# Patient Record
Sex: MALE | Race: WHITE | NOT HISPANIC OR LATINO
[De-identification: names, ages, dates, MRNs, and addresses within clinical notes are randomized per-mention and may not be internally consistent; named-entity substitution may affect disease eponyms.]

---

## 2017-01-09 ENCOUNTER — APPOINTMENT (OUTPATIENT)
Dept: HEART AND VASCULAR | Facility: CLINIC | Age: 37
End: 2017-01-09

## 2017-01-09 ENCOUNTER — NON-APPOINTMENT (OUTPATIENT)
Age: 37
End: 2017-01-09

## 2017-01-09 VITALS
DIASTOLIC BLOOD PRESSURE: 92 MMHG | HEART RATE: 107 BPM | WEIGHT: 297 LBS | BODY MASS INDEX: 40.23 KG/M2 | HEIGHT: 72 IN | OXYGEN SATURATION: 98 % | SYSTOLIC BLOOD PRESSURE: 138 MMHG

## 2017-01-09 DIAGNOSIS — E66.9 OBESITY, UNSPECIFIED: ICD-10-CM

## 2017-01-09 DIAGNOSIS — Z78.9 OTHER SPECIFIED HEALTH STATUS: ICD-10-CM

## 2017-01-09 DIAGNOSIS — Z87.891 PERSONAL HISTORY OF NICOTINE DEPENDENCE: ICD-10-CM

## 2017-01-09 DIAGNOSIS — Z83.3 FAMILY HISTORY OF DIABETES MELLITUS: ICD-10-CM

## 2017-01-09 RX ORDER — AMLODIPINE BESYLATE 5 MG/1
5 TABLET ORAL
Refills: 0 | Status: ACTIVE | COMMUNITY

## 2017-01-17 ENCOUNTER — APPOINTMENT (OUTPATIENT)
Dept: HEART AND VASCULAR | Facility: CLINIC | Age: 37
End: 2017-01-17

## 2017-01-17 VITALS — SYSTOLIC BLOOD PRESSURE: 142 MMHG | DIASTOLIC BLOOD PRESSURE: 70 MMHG

## 2017-01-17 DIAGNOSIS — I10 ESSENTIAL (PRIMARY) HYPERTENSION: ICD-10-CM

## 2017-01-17 DIAGNOSIS — R07.89 OTHER CHEST PAIN: ICD-10-CM

## 2017-01-17 DIAGNOSIS — R94.31 ABNORMAL ELECTROCARDIOGRAM [ECG] [EKG]: ICD-10-CM

## 2017-01-17 DIAGNOSIS — E78.5 HYPERLIPIDEMIA, UNSPECIFIED: ICD-10-CM

## 2017-01-17 RX ORDER — ROSUVASTATIN CALCIUM 10 MG/1
10 TABLET, FILM COATED ORAL DAILY
Qty: 30 | Refills: 6 | Status: ACTIVE | COMMUNITY
Start: 2017-01-17

## 2017-05-01 ENCOUNTER — APPOINTMENT (OUTPATIENT)
Dept: HEART AND VASCULAR | Facility: CLINIC | Age: 37
End: 2017-05-01

## 2019-10-08 ENCOUNTER — INPATIENT (INPATIENT)
Facility: HOSPITAL | Age: 39
LOS: 0 days | Discharge: HOME | DRG: 299 | End: 2019-10-09
Attending: INTERNAL MEDICINE | Admitting: INTERNAL MEDICINE
Payer: COMMERCIAL

## 2019-10-08 VITALS
RESPIRATION RATE: 20 BRPM | SYSTOLIC BLOOD PRESSURE: 116 MMHG | WEIGHT: 300.05 LBS | OXYGEN SATURATION: 91 % | HEART RATE: 125 BPM | TEMPERATURE: 98 F | DIASTOLIC BLOOD PRESSURE: 74 MMHG

## 2019-10-08 DIAGNOSIS — Z91.89 OTHER SPECIFIED PERSONAL RISK FACTORS, NOT ELSEWHERE CLASSIFIED: ICD-10-CM

## 2019-10-08 DIAGNOSIS — M10.9 GOUT, UNSPECIFIED: ICD-10-CM

## 2019-10-08 DIAGNOSIS — I26.99 OTHER PULMONARY EMBOLISM WITHOUT ACUTE COR PULMONALE: ICD-10-CM

## 2019-10-08 DIAGNOSIS — E04.1 NONTOXIC SINGLE THYROID NODULE: ICD-10-CM

## 2019-10-08 DIAGNOSIS — I80.10 PHLEBITIS AND THROMBOPHLEBITIS OF UNSPECIFIED FEMORAL VEIN: ICD-10-CM

## 2019-10-08 DIAGNOSIS — I82.491 ACUTE EMBOLISM AND THROMBOSIS OF OTHER SPECIFIED DEEP VEIN OF RIGHT LOWER EXTREMITY: ICD-10-CM

## 2019-10-08 DIAGNOSIS — I10 ESSENTIAL (PRIMARY) HYPERTENSION: ICD-10-CM

## 2019-10-08 DIAGNOSIS — F41.9 ANXIETY DISORDER, UNSPECIFIED: ICD-10-CM

## 2019-10-08 DIAGNOSIS — G47.33 OBSTRUCTIVE SLEEP APNEA (ADULT) (PEDIATRIC): ICD-10-CM

## 2019-10-08 DIAGNOSIS — J96.01 ACUTE RESPIRATORY FAILURE WITH HYPOXIA: ICD-10-CM

## 2019-10-08 DIAGNOSIS — Z29.9 ENCOUNTER FOR PROPHYLACTIC MEASURES, UNSPECIFIED: ICD-10-CM

## 2019-10-08 LAB
ALBUMIN SERPL ELPH-MCNC: 4.1 G/DL — SIGNIFICANT CHANGE UP (ref 3.4–5)
ALP SERPL-CCNC: 112 U/L — SIGNIFICANT CHANGE UP (ref 40–120)
ALT FLD-CCNC: 45 U/L — HIGH (ref 12–42)
ANION GAP SERPL CALC-SCNC: 11 MMOL/L — SIGNIFICANT CHANGE UP (ref 9–16)
APTT BLD: 120.6 SEC — CRITICAL HIGH (ref 27.5–36.3)
APTT BLD: 31.5 SEC — SIGNIFICANT CHANGE UP (ref 27.5–36.3)
AST SERPL-CCNC: 28 U/L — SIGNIFICANT CHANGE UP (ref 15–37)
BASOPHILS NFR BLD AUTO: 0.3 % — SIGNIFICANT CHANGE UP (ref 0–2)
BILIRUB SERPL-MCNC: 1.2 MG/DL — SIGNIFICANT CHANGE UP (ref 0.2–1.2)
BUN SERPL-MCNC: 10 MG/DL — SIGNIFICANT CHANGE UP (ref 7–23)
CALCIUM SERPL-MCNC: 9.1 MG/DL — SIGNIFICANT CHANGE UP (ref 8.5–10.5)
CHLORIDE SERPL-SCNC: 102 MMOL/L — SIGNIFICANT CHANGE UP (ref 96–108)
CO2 SERPL-SCNC: 27 MMOL/L — SIGNIFICANT CHANGE UP (ref 22–31)
CREAT SERPL-MCNC: 1.19 MG/DL — SIGNIFICANT CHANGE UP (ref 0.5–1.3)
D DIMER BLD IA.RAPID-MCNC: 879 NG/ML DDU — HIGH
EOSINOPHIL NFR BLD AUTO: 1.9 % — SIGNIFICANT CHANGE UP (ref 0–6)
GLUCOSE SERPL-MCNC: 167 MG/DL — HIGH (ref 70–99)
HCT VFR BLD CALC: 51.8 % — HIGH (ref 39–50)
HGB BLD-MCNC: 17.7 G/DL — HIGH (ref 13–17)
IMM GRANULOCYTES NFR BLD AUTO: 0.5 % — SIGNIFICANT CHANGE UP (ref 0–1.5)
INR BLD: 1.09 — SIGNIFICANT CHANGE UP (ref 0.88–1.16)
INR BLD: 1.09 — SIGNIFICANT CHANGE UP (ref 0.88–1.16)
LYMPHOCYTES # BLD AUTO: 22.4 % — SIGNIFICANT CHANGE UP (ref 13–44)
MCHC RBC-ENTMCNC: 29.6 PG — SIGNIFICANT CHANGE UP (ref 27–34)
MCHC RBC-ENTMCNC: 34.2 G/DL — SIGNIFICANT CHANGE UP (ref 32–36)
MCV RBC AUTO: 86.6 FL — SIGNIFICANT CHANGE UP (ref 80–100)
MONOCYTES NFR BLD AUTO: 8.3 % — SIGNIFICANT CHANGE UP (ref 2–14)
NEUTROPHILS NFR BLD AUTO: 66.6 % — SIGNIFICANT CHANGE UP (ref 43–77)
NT-PROBNP SERPL-SCNC: 27 PG/ML — SIGNIFICANT CHANGE UP
PLATELET # BLD AUTO: 288 K/UL — SIGNIFICANT CHANGE UP (ref 150–400)
POTASSIUM SERPL-MCNC: 3.9 MMOL/L — SIGNIFICANT CHANGE UP (ref 3.5–5.3)
POTASSIUM SERPL-SCNC: 3.9 MMOL/L — SIGNIFICANT CHANGE UP (ref 3.5–5.3)
PROT SERPL-MCNC: 8 G/DL — SIGNIFICANT CHANGE UP (ref 6.4–8.2)
PROTHROM AB SERPL-ACNC: 12.1 SEC — SIGNIFICANT CHANGE UP (ref 10–12.9)
PROTHROM AB SERPL-ACNC: 12.4 SEC — SIGNIFICANT CHANGE UP (ref 10–12.9)
RBC # BLD: 5.98 M/UL — HIGH (ref 4.2–5.8)
RBC # FLD: 12 % — SIGNIFICANT CHANGE UP (ref 10.3–14.5)
SODIUM SERPL-SCNC: 140 MMOL/L — SIGNIFICANT CHANGE UP (ref 132–145)
TROPONIN I SERPL-MCNC: <0.017 NG/ML — LOW (ref 0.02–0.06)
WBC # BLD: 12.1 K/UL — HIGH (ref 3.8–10.5)
WBC # FLD AUTO: 12.1 K/UL — HIGH (ref 3.8–10.5)

## 2019-10-08 PROCEDURE — 71275 CT ANGIOGRAPHY CHEST: CPT | Mod: 26

## 2019-10-08 PROCEDURE — 99285 EMERGENCY DEPT VISIT HI MDM: CPT

## 2019-10-08 PROCEDURE — 93971 EXTREMITY STUDY: CPT | Mod: 26,RT

## 2019-10-08 PROCEDURE — 93010 ELECTROCARDIOGRAM REPORT: CPT | Mod: NC

## 2019-10-08 PROCEDURE — 93010 ELECTROCARDIOGRAM REPORT: CPT

## 2019-10-08 PROCEDURE — 99223 1ST HOSP IP/OBS HIGH 75: CPT | Mod: GC

## 2019-10-08 RX ORDER — HEPARIN SODIUM 5000 [USP'U]/ML
INJECTION INTRAVENOUS; SUBCUTANEOUS
Qty: 25000 | Refills: 0 | Status: DISCONTINUED | OUTPATIENT
Start: 2019-10-08 | End: 2019-10-08

## 2019-10-08 RX ORDER — HEPARIN SODIUM 5000 [USP'U]/ML
10000 INJECTION INTRAVENOUS; SUBCUTANEOUS ONCE
Refills: 0 | Status: COMPLETED | OUTPATIENT
Start: 2019-10-08 | End: 2019-10-08

## 2019-10-08 RX ORDER — HEPARIN SODIUM 5000 [USP'U]/ML
2100 INJECTION INTRAVENOUS; SUBCUTANEOUS
Qty: 25000 | Refills: 0 | Status: DISCONTINUED | OUTPATIENT
Start: 2019-10-08 | End: 2019-10-09

## 2019-10-08 RX ORDER — ALLOPURINOL 300 MG
100 TABLET ORAL DAILY
Refills: 0 | Status: DISCONTINUED | OUTPATIENT
Start: 2019-10-08 | End: 2019-10-09

## 2019-10-08 RX ORDER — HEPARIN SODIUM 5000 [USP'U]/ML
5000 INJECTION INTRAVENOUS; SUBCUTANEOUS EVERY 6 HOURS
Refills: 0 | Status: DISCONTINUED | OUTPATIENT
Start: 2019-10-08 | End: 2019-10-08

## 2019-10-08 RX ORDER — SODIUM CHLORIDE 9 MG/ML
3 INJECTION INTRAMUSCULAR; INTRAVENOUS; SUBCUTANEOUS ONCE
Refills: 0 | Status: COMPLETED | OUTPATIENT
Start: 2019-10-08 | End: 2019-10-08

## 2019-10-08 RX ORDER — IPRATROPIUM/ALBUTEROL SULFATE 18-103MCG
3 AEROSOL WITH ADAPTER (GRAM) INHALATION EVERY 6 HOURS
Refills: 0 | Status: DISCONTINUED | OUTPATIENT
Start: 2019-10-08 | End: 2019-10-09

## 2019-10-08 RX ORDER — HEPARIN SODIUM 5000 [USP'U]/ML
10000 INJECTION INTRAVENOUS; SUBCUTANEOUS EVERY 6 HOURS
Refills: 0 | Status: DISCONTINUED | OUTPATIENT
Start: 2019-10-08 | End: 2019-10-08

## 2019-10-08 RX ORDER — SODIUM CHLORIDE 9 MG/ML
1000 INJECTION INTRAMUSCULAR; INTRAVENOUS; SUBCUTANEOUS ONCE
Refills: 0 | Status: COMPLETED | OUTPATIENT
Start: 2019-10-08 | End: 2019-10-08

## 2019-10-08 RX ADMIN — HEPARIN SODIUM 21 UNIT(S)/HR: 5000 INJECTION INTRAVENOUS; SUBCUTANEOUS at 21:19

## 2019-10-08 RX ADMIN — HEPARIN SODIUM 10000 UNIT(S): 5000 INJECTION INTRAVENOUS; SUBCUTANEOUS at 16:16

## 2019-10-08 RX ADMIN — HEPARIN SODIUM 2400 UNIT(S)/HR: 5000 INJECTION INTRAVENOUS; SUBCUTANEOUS at 16:16

## 2019-10-08 RX ADMIN — SODIUM CHLORIDE 3 MILLILITER(S): 9 INJECTION INTRAMUSCULAR; INTRAVENOUS; SUBCUTANEOUS at 15:04

## 2019-10-08 RX ADMIN — SODIUM CHLORIDE 1000 MILLILITER(S): 9 INJECTION INTRAMUSCULAR; INTRAVENOUS; SUBCUTANEOUS at 22:01

## 2019-10-08 NOTE — ED PROVIDER NOTE - OBJECTIVE STATEMENT
39 y o male with PMHX of HTN (Losartan 50mg, Nifedipine 60mg), HLD, and gout (Allopurinol) presents to ED to r/o DVT/PE. Complaining of LE pain and SOB. States that he was driving and noted pain in his right upper leg. Pt went to his doctor yesterday for evaluation of sx and resulted with an elevated D-dimer. States that he noticed SOB about 2-3 weeks ago when walking on the street and that sx escalated over the weekend. Denies extended travel. Denies smoking. No chest pain, cough, wheezing, calf swelling, N/V, or other sx.

## 2019-10-08 NOTE — H&P ADULT - PROBLEM SELECTOR PLAN 1
Progressive Sim, Ddimer >800 with acute lobar, segmental pulm artery emboli no PA dilation or RHS. Pt is tachy 100-110s. Has risk factors (DEANNE, obesity) without clear provoking triggers (sedentary lifestyle) no fam hx of clotting. Trops and BNP negative. EKG no ischemic changes. Arrived on heparin gtt. EKG No HD instabilty.   -Heparin o/n, plan to transition to oral AC in AM   -Echo   -Introduction to Heme-Onc for duration of treatment and possible hypercoag w/u   -AM ekg  -Possible Pulm consult for management of multiple Pulm co-morbidities

## 2019-10-08 NOTE — ED ADULT TRIAGE NOTE - CHIEF COMPLAINT QUOTE
Sent by [facility] for further evaluation of elevated D-Dimer, worsening SOB, and LLE pain/redness from thigh to calf. Denies CP.

## 2019-10-08 NOTE — ED PROVIDER NOTE - PROGRESS NOTE DETAILS
Results discussed with pt.  Heparin gtt initiated.  Pt accepted to [address] via [facility] transfer center.  Pt is amenable to admission.

## 2019-10-08 NOTE — H&P ADULT - PROBLEM SELECTOR PLAN 6
Risk factors are obesity and sedentary lifestyle. Extensive clot burden in RLE  Plan as above for PE.

## 2019-10-08 NOTE — H&P ADULT - PROBLEM SELECTOR PLAN 4
Incidental heterogenous enlarged thyroid gland with 1.7cm left lower pole nodule. No known thyroid disease in self or family. No overt hypo/hyperthyroid complaints.   -AM TSH  -Thyroid u/s  -Poss endo consult for outpt f/u

## 2019-10-08 NOTE — H&P ADULT - HISTORY OF PRESENT ILLNESS
39M PMH obesity, severe DEANNE presents to [facility] for 2-3 weeks of Sim doing routine activities and 2-3 days of RLE swelling and pain. Pt states at baseline has Sim with strenuous activity (2/2 weight_ now progressive becoming SoB walking to work and light-headed standing up, now a/w dull, cramp-like calf pain. Not associated with sternal chest pain/pressure, diaphoresis, HA, syncopal episodes/falls, 39M PMH obesity, severe DEANNE presents to [facility] for 2-3 weeks of Sim doing routine activities and 2-3 days of RLE swelling and pain. Pt states at baseline has Sim with strenuous activity (2/2 weight) but now progressive becoming SoB walking to work and light-headed standing up, now a/w dull, cramp-like calf pain. Not associated with sternal chest pain/pressure, diaphoresis, HA, syncopal episodes/falls. Sent to [facility] after elevated d-dimer at PCP's office.     [facility] ED Course  Vitals: , /88,  SpO2 91% on RA requiring 2L NC  EKG with sinus tachycardia, no st/t wave change  Pertinent labs: D-dimer 879, trop I<.017, pro-BNP 27; WBC 12.1, Hb 17.7, Cre 1.19  CT-PE: acute lobar, segmental pulm artery emboli no PA dilation or RHS, also heterogenous enlarged thyroid gland with 1.7cm left lower pole nodule. LE dopplers: extensive right-sided DVT extending from the superficial femoral femoral vein to calf veins.  ED interventions: heparin 10,000U IVP then started heparin gtt at 24ml/hr    On arrival on 7lach: -100, /91, RR 16  EKG: sinus tachy 102, left axis deviation, deep S1, Q in V3 and T wave flattening in III; poor R wave progression; no ST/T wave changes   Pt slight tachypneic, no acute distress, gives history above. 39M PMH HTN, gout, obesity, severe DEANNE noncompliant with CPAP presents to [facility] for 2-3 weeks of Sim doing routine activities and 2-3 days of RLE swelling and pain. Pt states at baseline has Sim with strenuous activity (2/2 weight) but now progressive becoming SoB walking to work and light-headed standing up, now a/w dull, cramp-like calf pain. Not associated with sternal chest pain/pressure, diaphoresis, HA, syncopal episodes/falls. Sent to [facility] after elevated d-dimer at PCP's office.     [facility] ED Course  Vitals: , /88,  SpO2 91% on RA requiring 2L NC  EKG with sinus tachycardia, no st/t wave change  Pertinent labs: D-dimer 879, trop I<.017, pro-BNP 27; WBC 12.1, Hb 17.7, Cre 1.19  CT-PE: acute lobar, segmental pulm artery emboli no PA dilation or RHS, also heterogenous enlarged thyroid gland with 1.7cm left lower pole nodule. LE dopplers: extensive right-sided DVT extending from the superficial femoral femoral vein to calf veins.  ED interventions: heparin 10,000U IVP then started heparin gtt at 24ml/hr    On arrival on 7lach: -100, /91, RR 16  EKG: sinus tachy 102, left axis deviation, deep S1, Q in V3 and T wave flattening in III; poor R wave progression; no ST/T wave changes   Pt slight tachypneic, no acute distress, gives history above.

## 2019-10-08 NOTE — H&P ADULT - ASSESSMENT
39M PMH HTN, gout, obesity, severe DEANNE noncompliant with CPAP presents presents with two weeks of SoB found to be in acute hypoxic respiratory failure 2/2 bilateral pulmonary emboli without evidence of right heart strain likely 2/2 extensive RLE clot.

## 2019-10-08 NOTE — H&P ADULT - PROBLEM SELECTOR PLAN 3
Diagnosed DEANNE on CPAP noncompliant. Noted HB 17. Daytime sleepiness. Mallampati IV. Obese. Significant risk factors for pHTN  -Encouraged Pt to get more comfortable machine   -Nutrition consult     #Obesity  -Nutrition consult    #Polycythemia   Hb 17. Suspect reactive to hypoventilation/DEANNE.   -Trend CBC  -Consider heme-onc w/u

## 2019-10-08 NOTE — ED PROVIDER NOTE - ATTENDING CONTRIBUTION TO CARE
pt w lower extremity swelling and +US for DVT. Also sob, CTA shows B/L PEs. pt with high clot burden, treated w heparin drip. Admit to stepdown.

## 2019-10-08 NOTE — ED PROVIDER NOTE - MUSCULOSKELETAL, MLM
Spine appears normal, range of motion is not limited. Bilateral calf edema, right greater than left, no tenderness.

## 2019-10-08 NOTE — ED ADULT NURSE NOTE - OBJECTIVE STATEMENT
AOX4 +ambulatory patient reports R leg pain worsening x 2-3 weeks. Patient states he was sob a couple weeks ago. Denies any cp no trauma no long travels

## 2019-10-08 NOTE — ED PROVIDER NOTE - CARE PLAN
Principal Discharge DX:	Pulmonary embolism  Secondary Diagnosis:	Acute deep vein thrombosis (DVT) of other specified vein of right lower extremity

## 2019-10-08 NOTE — H&P ADULT - PROBLEM SELECTOR PLAN 2
Currently 91% on RA and SoB with exertion likely 2/2 clot burden  -oMnserrat PRN   -OOBTC   -I Currently 91% on RA and SoB with exertion likely 2/2 clot burden. Wean O2 as tolerated.   -Monserrat PRN   -OOBTC   -Incentive ritesh

## 2019-10-08 NOTE — ED PROVIDER NOTE - CLINICAL SUMMARY MEDICAL DECISION MAKING FREE TEXT BOX
Pt presents to r/o PE/DVT after having an elevated D-dimer. Notes SOB and left lower leg pain. Will perform workup with ordered D-dimer and troponin. CT Angio chest and US duplex ordered. Give IV fluids. EKG ordered. Reassess.

## 2019-10-08 NOTE — H&P ADULT - NSHPPHYSICALEXAM_GEN_ALL_CORE
.  VITAL SIGNS:  T(C): 37.4 (10-08-19 @ 22:04), Max: 37.4 (10-08-19 @ 22:04)  T(F): 99.3 (10-08-19 @ 22:04), Max: 99.3 (10-08-19 @ 22:04)  HR: 112 (10-08-19 @ 21:38) (92 - 125)  BP: 139/86 (10-08-19 @ 21:38) (116/74 - 164/94)  BP(mean): 108 (10-08-19 @ 21:38) (100 - 108)  RR: 14 (10-08-19 @ 21:38) (14 - 20)  SpO2: 94% (10-08-19 @ 21:38) (91% - 95%)  Wt(kg): --    PHYSICAL EXAM:    Constitutional: obese man, slight tachypnea 18, sitting comfortably in bed speaking comfortably; flushed skin   Head: NC/AT  Eyes: PERRL, EOMI, anicteric sclera  ENT: no nasal discharge; uvula midline, no oropharyngeal erythema or exudates; dry mucosa, mallampati IV  Neck: supple; palpable thyroid gland  Respiratory: CTA B/L; no W/R/R, no retractions  Cardiac: +S1/S2; RRR; no M/R/G; PMI non-displaced  Gastrointestinal: soft, NT/ND; no rebound or guarding; +BSx4  Back: spine midline, no bony tenderness or step-offs; no CVAT B/L  Extremities: pulses 2+ equal and symmetric. RLE noticable 2-3cm bigger than LLE, trace pitting edema in right; full ROM, sensation and strength intact  Musculoskeletal: NROM x4; no joint swelling, tenderness or erythema  Vascular: 2+ radial, femoral, DP/PT pulses B/L  Dermatologic: skin warm, dry and intact; no rashes, wounds, or scars  Lymphatic: no submandibular or cervical LAD  Neurologic: AAOx3; CNII-XII grossly intact; no focal deficits  Psychiatric: affect and characteristics of appearance, verbalizations, behaviors are appropriate

## 2019-10-08 NOTE — H&P ADULT - PROBLEM SELECTOR PLAN 7
help with over-eating. Would like resources.   -Psych consult    #Elevated Cre  -Cre 1.17 suspect EMANUEL  -Urine lytes  -Trend BMP  -Strict i/o

## 2019-10-08 NOTE — H&P ADULT - NSHPSOCIALHISTORY_GEN_ALL_CORE
Never smoker, social etoh 1-2 drinks social drinker, used recreational drugs (cocaine over 20yrs ago). No fam hx of early cancer, blood clots, thyroid issues. Father had MI at 55.

## 2019-10-08 NOTE — H&P ADULT - PROBLEM SELECTOR PLAN 5
On losartan 50 and nifedipine 60 ER at home. Likely exac by obesity/connie.   -Will hold for now in case HD unstable 2/2 PE   -Restart as pressures tolerate in AM

## 2019-10-09 ENCOUNTER — TRANSCRIPTION ENCOUNTER (OUTPATIENT)
Age: 39
End: 2019-10-09

## 2019-10-09 VITALS — TEMPERATURE: 98 F

## 2019-10-09 LAB
ANION GAP SERPL CALC-SCNC: 15 MMOL/L — SIGNIFICANT CHANGE UP (ref 5–17)
APPEARANCE UR: CLEAR — SIGNIFICANT CHANGE UP
APTT BLD: 35.8 SEC — SIGNIFICANT CHANGE UP (ref 27.5–36.3)
APTT BLD: 68.2 SEC — HIGH (ref 27.5–36.3)
APTT BLD: 75.4 SEC — HIGH (ref 27.5–36.3)
BILIRUB UR-MCNC: ABNORMAL
BUN SERPL-MCNC: 10 MG/DL — SIGNIFICANT CHANGE UP (ref 7–23)
CALCIUM SERPL-MCNC: 9 MG/DL — SIGNIFICANT CHANGE UP (ref 8.4–10.5)
CHLORIDE SERPL-SCNC: 104 MMOL/L — SIGNIFICANT CHANGE UP (ref 96–108)
CHOLEST SERPL-MCNC: 225 MG/DL — HIGH (ref 10–199)
CO2 SERPL-SCNC: 22 MMOL/L — SIGNIFICANT CHANGE UP (ref 22–31)
COLOR SPEC: YELLOW — SIGNIFICANT CHANGE UP
CREAT ?TM UR-MCNC: 284 MG/DL — SIGNIFICANT CHANGE UP
CREAT SERPL-MCNC: 1 MG/DL — SIGNIFICANT CHANGE UP (ref 0.5–1.3)
DIFF PNL FLD: NEGATIVE — SIGNIFICANT CHANGE UP
GLUCOSE SERPL-MCNC: 88 MG/DL — SIGNIFICANT CHANGE UP (ref 70–99)
GLUCOSE UR QL: NEGATIVE — SIGNIFICANT CHANGE UP
HBA1C BLD-MCNC: 6.2 % — HIGH (ref 4–5.6)
HCT VFR BLD CALC: 45.5 % — SIGNIFICANT CHANGE UP (ref 39–50)
HDLC SERPL-MCNC: 35 MG/DL — LOW
HGB BLD-MCNC: 14.8 G/DL — SIGNIFICANT CHANGE UP (ref 13–17)
HIV 1+2 AB+HIV1 P24 AG SERPL QL IA: SIGNIFICANT CHANGE UP
KETONES UR-MCNC: ABNORMAL MG/DL
LEUKOCYTE ESTERASE UR-ACNC: NEGATIVE — SIGNIFICANT CHANGE UP
LIPID PNL WITH DIRECT LDL SERPL: 149 MG/DL — HIGH
MAGNESIUM SERPL-MCNC: 2.1 MG/DL — SIGNIFICANT CHANGE UP (ref 1.6–2.6)
MCHC RBC-ENTMCNC: 29.5 PG — SIGNIFICANT CHANGE UP (ref 27–34)
MCHC RBC-ENTMCNC: 32.5 GM/DL — SIGNIFICANT CHANGE UP (ref 32–36)
MCV RBC AUTO: 90.8 FL — SIGNIFICANT CHANGE UP (ref 80–100)
NITRITE UR-MCNC: NEGATIVE — SIGNIFICANT CHANGE UP
NRBC # BLD: 0 /100 WBCS — SIGNIFICANT CHANGE UP (ref 0–0)
PH UR: 6 — SIGNIFICANT CHANGE UP (ref 5–8)
PHOSPHATE SERPL-MCNC: 4 MG/DL — SIGNIFICANT CHANGE UP (ref 2.5–4.5)
PLATELET # BLD AUTO: 244 K/UL — SIGNIFICANT CHANGE UP (ref 150–400)
POTASSIUM SERPL-MCNC: 4.1 MMOL/L — SIGNIFICANT CHANGE UP (ref 3.5–5.3)
POTASSIUM SERPL-SCNC: 4.1 MMOL/L — SIGNIFICANT CHANGE UP (ref 3.5–5.3)
PROT UR-MCNC: 30 MG/DL
RBC # BLD: 5.01 M/UL — SIGNIFICANT CHANGE UP (ref 4.2–5.8)
RBC # FLD: 11.7 % — SIGNIFICANT CHANGE UP (ref 10.3–14.5)
SODIUM SERPL-SCNC: 141 MMOL/L — SIGNIFICANT CHANGE UP (ref 135–145)
SODIUM UR-SCNC: 106 MMOL/L — SIGNIFICANT CHANGE UP
SP GR SPEC: 1.02 — SIGNIFICANT CHANGE UP (ref 1–1.03)
TOTAL CHOLESTEROL/HDL RATIO MEASUREMENT: 6.4 RATIO — SIGNIFICANT CHANGE UP (ref 3.4–9.6)
TRIGL SERPL-MCNC: 205 MG/DL — HIGH (ref 10–149)
TSH SERPL-MCNC: 1 UIU/ML — SIGNIFICANT CHANGE UP (ref 0.35–4.94)
UROBILINOGEN FLD QL: 0.2 E.U./DL — SIGNIFICANT CHANGE UP
WBC # BLD: 9.54 K/UL — SIGNIFICANT CHANGE UP (ref 3.8–10.5)
WBC # FLD AUTO: 9.54 K/UL — SIGNIFICANT CHANGE UP (ref 3.8–10.5)

## 2019-10-09 PROCEDURE — 76536 US EXAM OF HEAD AND NECK: CPT | Mod: 26

## 2019-10-09 PROCEDURE — 86900 BLOOD TYPING SEROLOGIC ABO: CPT

## 2019-10-09 PROCEDURE — 83735 ASSAY OF MAGNESIUM: CPT

## 2019-10-09 PROCEDURE — 80048 BASIC METABOLIC PNL TOTAL CA: CPT

## 2019-10-09 PROCEDURE — 84484 ASSAY OF TROPONIN QUANT: CPT

## 2019-10-09 PROCEDURE — 83036 HEMOGLOBIN GLYCOSYLATED A1C: CPT

## 2019-10-09 PROCEDURE — 84443 ASSAY THYROID STIM HORMONE: CPT

## 2019-10-09 PROCEDURE — 86901 BLOOD TYPING SEROLOGIC RH(D): CPT

## 2019-10-09 PROCEDURE — 36415 COLL VENOUS BLD VENIPUNCTURE: CPT

## 2019-10-09 PROCEDURE — 85379 FIBRIN DEGRADATION QUANT: CPT

## 2019-10-09 PROCEDURE — 71275 CT ANGIOGRAPHY CHEST: CPT

## 2019-10-09 PROCEDURE — 80061 LIPID PANEL: CPT

## 2019-10-09 PROCEDURE — 96374 THER/PROPH/DIAG INJ IV PUSH: CPT | Mod: XU

## 2019-10-09 PROCEDURE — 71045 X-RAY EXAM CHEST 1 VIEW: CPT

## 2019-10-09 PROCEDURE — 93971 EXTREMITY STUDY: CPT

## 2019-10-09 PROCEDURE — 82570 ASSAY OF URINE CREATININE: CPT

## 2019-10-09 PROCEDURE — 93306 TTE W/DOPPLER COMPLETE: CPT

## 2019-10-09 PROCEDURE — 76536 US EXAM OF HEAD AND NECK: CPT

## 2019-10-09 PROCEDURE — 83880 ASSAY OF NATRIURETIC PEPTIDE: CPT

## 2019-10-09 PROCEDURE — 85025 COMPLETE CBC W/AUTO DIFF WBC: CPT

## 2019-10-09 PROCEDURE — 84100 ASSAY OF PHOSPHORUS: CPT

## 2019-10-09 PROCEDURE — 93306 TTE W/DOPPLER COMPLETE: CPT | Mod: 26

## 2019-10-09 PROCEDURE — 93005 ELECTROCARDIOGRAM TRACING: CPT

## 2019-10-09 PROCEDURE — 85027 COMPLETE CBC AUTOMATED: CPT

## 2019-10-09 PROCEDURE — 87389 HIV-1 AG W/HIV-1&-2 AB AG IA: CPT

## 2019-10-09 PROCEDURE — 81001 URINALYSIS AUTO W/SCOPE: CPT

## 2019-10-09 PROCEDURE — 84300 ASSAY OF URINE SODIUM: CPT

## 2019-10-09 PROCEDURE — 99285 EMERGENCY DEPT VISIT HI MDM: CPT | Mod: 25

## 2019-10-09 PROCEDURE — 85610 PROTHROMBIN TIME: CPT

## 2019-10-09 PROCEDURE — 86850 RBC ANTIBODY SCREEN: CPT

## 2019-10-09 PROCEDURE — 85730 THROMBOPLASTIN TIME PARTIAL: CPT

## 2019-10-09 PROCEDURE — 71045 X-RAY EXAM CHEST 1 VIEW: CPT | Mod: 26

## 2019-10-09 PROCEDURE — 99233 SBSQ HOSP IP/OBS HIGH 50: CPT | Mod: GC

## 2019-10-09 PROCEDURE — 80053 COMPREHEN METABOLIC PANEL: CPT

## 2019-10-09 RX ORDER — LOSARTAN POTASSIUM 100 MG/1
1 TABLET, FILM COATED ORAL
Qty: 0 | Refills: 0 | DISCHARGE

## 2019-10-09 RX ORDER — APIXABAN 2.5 MG/1
1 TABLET, FILM COATED ORAL
Qty: 90 | Refills: 0
Start: 2019-10-09 | End: 2019-11-07

## 2019-10-09 RX ORDER — APIXABAN 2.5 MG/1
1 TABLET, FILM COATED ORAL
Qty: 74 | Refills: 0
Start: 2019-10-09 | End: 2019-11-07

## 2019-10-09 RX ORDER — ACETAMINOPHEN 500 MG
650 TABLET ORAL EVERY 6 HOURS
Refills: 0 | Status: DISCONTINUED | OUTPATIENT
Start: 2019-10-09 | End: 2019-10-09

## 2019-10-09 RX ORDER — APIXABAN 2.5 MG/1
10 TABLET, FILM COATED ORAL EVERY 12 HOURS
Refills: 0 | Status: DISCONTINUED | OUTPATIENT
Start: 2019-10-09 | End: 2019-10-09

## 2019-10-09 RX ORDER — NIFEDIPINE 30 MG
1 TABLET, EXTENDED RELEASE 24 HR ORAL
Qty: 0 | Refills: 0 | DISCHARGE

## 2019-10-09 RX ORDER — ALLOPURINOL 300 MG
1 TABLET ORAL
Qty: 0 | Refills: 0 | DISCHARGE

## 2019-10-09 RX ADMIN — Medication 100 MILLIGRAM(S): at 11:32

## 2019-10-09 RX ADMIN — APIXABAN 10 MILLIGRAM(S): 2.5 TABLET, FILM COATED ORAL at 13:18

## 2019-10-09 RX ADMIN — HEPARIN SODIUM 21 UNIT(S)/HR: 5000 INJECTION INTRAVENOUS; SUBCUTANEOUS at 00:36

## 2019-10-09 RX ADMIN — Medication 650 MILLIGRAM(S): at 06:19

## 2019-10-09 NOTE — PROGRESS NOTE ADULT - SUBJECTIVE AND OBJECTIVE BOX
Vascular Attending:  Carlos      HPI:  39M PMH HTN, gout, obesity, severe DEANNE noncompliant with CPAP presents to [facility] for 2-3 weeks of Gee doing routine activities and 2-3 days of RLE swelling and pain. Pt states at baseline has Gee with strenuous activity (2/2 weight) but now progressive becoming SoB walking to work and light-headed standing up, now a/w dull, cramp-like calf pain. Not associated with sternal chest pain/pressure, diaphoresis, HA, syncopal episodes/falls. Sent to [facility] after elevated d-dimer at PCP's office.     [facility] ED Course  Vitals: , /88,  SpO2 91% on RA requiring 2L NC  EKG with sinus tachycardia, no st/t wave change  Pertinent labs: D-dimer 879, trop I<.017, pro-BNP 27; WBC 12.1, Hb 17.7, Cre 1.19  CT-PE: acute lobar, segmental pulm artery emboli no PA dilation or RHS, also heterogenous enlarged thyroid gland with 1.7cm left lower pole nodule. LE dopplers: extensive right-sided DVT extending from the superficial femoral femoral vein to calf veins.  ED interventions: heparin 10,000U IVP then started heparin gtt at 24ml/hr    On arrival on 7lach: -100, /91, RR 16  EKG: sinus tachy 102, left axis deviation, deep S1, Q in V3 and T wave flattening in III; poor R wave progression; no ST/T wave changes   Pt slight tachypneic, no acute distress, gives history above. (08 Oct 2019 19:00)    Vascular consulted for < from: CT Angio Chest PE Protocol w/ IV Cont (10.08.19 @ 16:09) >Bilateral acute appearing lobar and segmental branch pulmonary artery emboli without evidence of a main pulmonary artery dilatation or right heart strain. < end of copied text > and < from: US Duplex Ext Veins Limited, Right (10.08.19 @ 14:57) >Extensive right-sided deep venous thrombosis extending from the superficial femoral femoral vein to calf veins. < end of copied text > GEE more than his usual baseline began 2 weeks ago.  Then on Friday started with Rt calf cramping and by  he could barely walk.  Went to his PCP on Monday sent off some labs which resulted on Tuesday and since he ddimer was elevated his PCP told him to come to the ED to be evaluted.  Patient states lives a sedentary lifestyle.  He is a graffic  so sits a lot.  He is not a smoker and has had no recent travel.      PAST MEDICAL & SURGICAL HISTORY:  Gout  HLD (hyperlipidemia)  HTN (hypertension)  No significant past surgical history      MEDICATIONS  (STANDING):  allopurinol 100 milliGRAM(s) Oral daily  heparin  Infusion 2100 Unit(s)/Hr (21 mL/Hr) IV Continuous <Continuous>    MEDICATIONS  (PRN):  acetaminophen   Tablet .. 650 milliGRAM(s) Oral every 6 hours PRN Moderate Pain (4 - 6)  ALBUTerol/ipratropium for Nebulization 3 milliLiter(s) Nebulizer every 6 hours PRN Wheezing      Allergies    No Known Allergies    Intolerances        SOCIAL HISTORY:    FAMILY HISTORY:      Vital Signs Last 24 Hrs  T(C): 36.7 (09 Oct 2019 09:54), Max: 37.4 (08 Oct 2019 22:04)  T(F): 98 (09 Oct 2019 09:54), Max: 99.3 (08 Oct 2019 22:04)  HR: 84 (09 Oct 2019 08:20) (72 - 125)  BP: 123/69 (09 Oct 2019 08:20) (116/74 - 164/94)  BP(mean): 91 (09 Oct 2019 08:20) (91 - 108)  RR: 16 (09 Oct 2019 08:20) (14 - 20)  SpO2: 95% (09 Oct 2019 08:20) (91% - 98%)    PHYSICAL EXAM:      Constitutional: NAD    Respiratory: nonlabored breathing on nasal cannula    Cardiovascular: s1s2 rrr    Gastrointestinal: obese soft nt/nd    Extremities: WWP RLE swelling from foot to below knee no calf tenderness    Vascular: 2+ pal pulses throughout    Neurological: intact      LABS:                        14.8   9.54  )-----------( 244      ( 09 Oct 2019 07:59 )             45.5     10-08    140  |  102  |  10  ----------------------------<  167<H>  3.9   |  27  |  1.19    Ca    9.1      08 Oct 2019 13:30  Phos  4.0     10-09  Mg     2.1     10-09    TPro  8.0  /  Alb  4.1  /  TBili  1.2  /  DBili  x   /  AST  28  /  ALT  45<H>  /  AlkPhos  112  10-08    PT/INR - ( 08 Oct 2019 20:32 )   PT: 12.4 sec;   INR: 1.09          PTT - ( 09 Oct 2019 07:59 )  PTT:68.2 sec  Urinalysis Basic - ( 09 Oct 2019 04:55 )    Color: Yellow / Appearance: Clear / S.025 / pH: x  Gluc: x / Ketone: Trace mg/dL  / Bili: Small / Urobili: 0.2 E.U./dL   Blood: x / Protein: 30 mg/dL / Nitrite: NEGATIVE   Leuk Esterase: NEGATIVE / RBC: < 5 /HPF / WBC < 5 /HPF   Sq Epi: x / Non Sq Epi: 0-5 /HPF / Bacteria: Present /HPF        RADIOLOGY & ADDITIONAL STUDIES

## 2019-10-09 NOTE — PROGRESS NOTE ADULT - ASSESSMENT
39M PMH HTN, gout, obesity, severe DEANNE noncompliant with CPAP with DVT/PE    -cont anticoagulation  -f/u echo  -No vascular intervention at this time  -will d/w Dr. Gonzalez

## 2019-10-09 NOTE — DISCHARGE NOTE PROVIDER - NSDCFUADDAPPT_GEN_ALL_CORE_FT
Please call the offices of Dr. Coleman, and Dr. Garcia at the numbers above to schedule follow up appointments after you are discharged from the hospital.

## 2019-10-09 NOTE — DIETITIAN INITIAL EVALUATION ADULT. - OTHER INFO
39 y.o M PMHx significant for HTN, gout, obesity, severe DEANNE noncompliant with CPAP p/w 2 weeks of SOB found to be in acute hypoxic respiratory failure 2/2 b/l pulmonary emboli without evidence of right heart strain likely 2/2 extensive RLE clot. Pending Echo and EKG.     Pt seen resting in bed. Pt reports good appetite and PO intake PTA, consuming mostly pasta, ground beef, chicken breast and avoids vegetables 2/2 trauma associated w/ eating vegetables when younger and reports difficulty trying new vegetables. Confirms NKFA. Pt states UBW ~295lbs; Now noted with 300 lbs (10/8). Wt appears relatively stable. Pt endorses continued good appetite, consuming >75% for lunch today. No pain reported at this time. Denies N/V/D/C. +BM 10/7. Extensive nutrition education provided on wt management, heart-healthy, pre-DM diet therapy w/ handouts. Answered all relevant nutrition-related questions. Offered info to outpatient nutrition services; Pt amenable and appears motivated. Pt receptive to nutrition education, suspect likely needing reinforcement. Reviewed menu options in-hospital. RD will f/u per protocol. 39 y.o M PMHx significant for HTN, gout, obesity, severe DEANNE noncompliant with CPAP p/w 2 weeks of SOB found to be in acute hypoxic respiratory failure 2/2 b/l pulmonary emboli without evidence of right heart strain likely 2/2 extensive RLE clot. Pending Echo and EKG.     Pt seen resting in bed. Pt reports good appetite and PO intake PTA, consuming mostly pasta, ground beef, chicken breast, bread, and avoids vegetables 2/2 trauma associated w/ eating vegetables when younger and reports difficulty trying new vegetables. Reports allergy to shellfish-updated on EMR. Pt states UBW ~295lbs; Now noted with 300 lbs (10/8). Wt appears relatively stable. Pt endorses continued good appetite, consuming >75% for lunch today. No pain reported at this time. Denies N/V/D/C. +BM 10/7. Extensive nutrition education provided on wt management, heart-healthy, pre-DM diet therapy w/ handouts. Answered all relevant nutrition-related questions. Offered info to outpatient nutrition services; Pt amenable and appears motivated. Pt receptive to nutrition education, suspect likely needing reinforcement. Reviewed menu options in-hospital. RD will f/u per protocol.

## 2019-10-09 NOTE — PROGRESS NOTE ADULT - PROBLEM SELECTOR PLAN 7
help with over-eating. Would like resources.   -Psych consult    #Elevated Cre  -Cre 1.17 suspect EMANUEL  -Urine lytes  -Trend BMP  -Strict i/o help with over-eating. Would like resources.   -Psych consult    #Elevated Cre  -Cre 1.00 on am labs, resolved  -Strict i/o

## 2019-10-09 NOTE — PROGRESS NOTE ADULT - PROBLEM SELECTOR PLAN 6
Risk factors are obesity and sedentary lifestyle. Extensive clot burden in RLE  Plan as above for PE.  -no vascular intervention at this time

## 2019-10-09 NOTE — DIETITIAN INITIAL EVALUATION ADULT. - DIET TYPE
Diet, Consistent Carbohydrate/No Snacks:   DASH/TLC {Sodium & Cholesterol Restricted} (DASH) (10-09-19 @ 00:28)

## 2019-10-09 NOTE — DISCHARGE NOTE PROVIDER - CARE PROVIDERS DIRECT ADDRESSES
,DirectAddress_Unknown,[email or web address]

## 2019-10-09 NOTE — CHART NOTE - NSCHARTNOTEFT_GEN_A_CORE
Upon Nutritional Assessment by the Registered Dietitian your patient was determined to meet criteria / has evidence of the following diagnosis/diagnoses:          [ ]  Mild Protein Calorie Malnutrition        [ ]  Moderate Protein Calorie Malnutrition        [ ] Severe Protein Calorie Malnutrition        [ ] Unspecified Protein Calorie Malnutrition        [ ] Underweight / BMI <19        [ X ] Morbid Obesity / BMI > 40      Findings as based on:  •  Comprehensive nutrition assessment and consultation  Stated Ht: 71.5 in Wt: 300 lbs IBW: 175lbs (+/-10%), 171%IBW, BMI: 41.3 kg/m2       Treatment:    The following diet has been recommended:  1) Recommend continue CSTCHO w/ no snacks, DASH/TLC diet.   2) Extensive nutrition education provided; See diet initial. Pt receptive.  3) Offered info to outpatient [facility] nutrition services; Pt amenable   4) Monitor wt trends.   5) Monitor lytes and replete prn.     PROVIDER Section:     By signing this assessment you are acknowledging and agree with the diagnosis/diagnoses assigned by the Registered Dietitian    Comments: Upon Nutritional Assessment by the Registered Dietitian your patient was determined to meet criteria / has evidence of the following diagnosis/diagnoses:          [ ]  Mild Protein Calorie Malnutrition        [ ]  Moderate Protein Calorie Malnutrition        [ ] Severe Protein Calorie Malnutrition        [ ] Unspecified Protein Calorie Malnutrition        [ ] Underweight / BMI <19        [ X ] Morbid Obesity / BMI > 40      Findings as based on:  •  Comprehensive nutrition assessment and consultation  Stated Ht: 71.5 in Wt: 300 lbs (10/8) IBW: 175lbs (+/-10%), 171%IBW, BMI: 41.3 kg/m2       Treatment:    The following diet has been recommended:  1) Recommend continue CSTCHO w/ no snacks, DASH/TLC diet.   2) Extensive nutrition education provided; See diet initial. Pt receptive.  3) Offered info to outpatient [facility] nutrition services; Pt amenable   4) Monitor wt trends.   5) Monitor lytes and replete prn.     PROVIDER Section:     By signing this assessment you are acknowledging and agree with the diagnosis/diagnoses assigned by the Registered Dietitian    Comments:

## 2019-10-09 NOTE — DISCHARGE NOTE PROVIDER - HOSPITAL COURSE
39M PMH HTN, gout, obesity, severe DEANNE noncompliant with CPAP presents presents with two weeks of SoB found to be in acute hypoxic respiratory failure 2/2 bilateral pulmonary emboli without evidence of right heart strain likely 2/2 extensive RLE clot.         Problem List/Main Diagnoses (system-based):     Inpatient treatment course:     New medications:     Labs to be followed outpatient:     Exam to be followed outpatient: 39M PMH HTN, gout, obesity, severe DEANNE noncompliant with CPAP presents presents with two weeks of SoB found to be in acute hypoxic respiratory failure 2/2 bilateral pulmonary emboli without evidence of right heart strain likely 2/2 extensive RLE clot.         Problem List/Main Diagnoses (system-based):     1) PE    2) DVT        Inpatient treatment course:     1) PE-originally managed on heparin drip and transitioned to eliquis     New medications:     2) DVT- see above        Labs to be followed outpatient:     -None        Exam to be followed outpatient:     -None

## 2019-10-09 NOTE — PROGRESS NOTE ADULT - PROBLEM SELECTOR PLAN 2
Currently 914% on RA and SoB with exertion likely 2/2 clot burden. Wean O2 as tolerated.   -Monserrat PRN   -OOBTC   -Incentive ritehs

## 2019-10-09 NOTE — PROGRESS NOTE ADULT - PROBLEM SELECTOR PLAN 3
Diagnosed DEANNE on CPAP noncompliant. Noted HB 17. Daytime sleepiness. Mallampati IV. Obese. Significant risk factors for pHTN  -Encouraged Pt to get more comfortable machine   -Nutrition consult     #Obesity  -Nutrition consult    #Polycythemia   Hb 14.8 now resolved   -Trend CBC

## 2019-10-09 NOTE — DISCHARGE NOTE PROVIDER - NSDCCPCAREPLAN_GEN_ALL_CORE_FT
PRINCIPAL DISCHARGE DIAGNOSIS  Diagnosis: Pulmonary embolism  Assessment and Plan of Treatment: Pulmonary embolism (or "PE") is a blockage in one or more of the blood vessels that supply blood to the lungs. Most often these blockages are caused by blood clots that form elsewhere and then travel to the lungs. In rare cases, blockages can also be caused by air bubbles, tiny globs of fat, or pieces of tumor that travel to the lungs. If a blood clot forms or gets stuck inside a blood vessel, it can clog the vessel and keep blood from getting where it needs to go. When that happens in the lungs, the lungs can get damaged. Having blocked arteries in the lung can also make it hard to breathe and can even lead to death. Common symptoms include panting, shortness of breath, or trouble breathing, sharp, knife-like chest pain when you breathe in or strain, coughing or coughing up blood or simply a rapid heartbeat. If you get any of these symptoms, especially if they happen over a short period of time (hours or days) or get worse quickly, call for an ambulance. At the hospital, doctors can run tests to find out if you do have a clot. Blood clots in the lungs can lead to death. That's why it's important to act fast and find out if there is a clot.        SECONDARY DISCHARGE DIAGNOSES  Diagnosis: DEANNE (obstructive sleep apnea)  Assessment and Plan of Treatment:     Diagnosis: Obesity  Assessment and Plan of Treatment:     Diagnosis: Gout  Assessment and Plan of Treatment:     Diagnosis: Acute deep vein thrombosis (DVT) of other specified vein of right lower extremity  Assessment and Plan of Treatment: You have a history of blood clots forming in your right leg. This is dangerous because the clot can travel from your legs into your lungs, causing a sudden blockage of an artery and making difficult for you to breathe. In order to prevent this from happening, it is important that you take your blood thinner medication everyday and avoid laying/sitting for prolonged periods of time. Please see your primary care physician in 1-2 weeks. If you happen to experience sudden shortness of breath, chest pain, or a fast beating heart, please return to the emergency department for interval evaluation of your deep vein thrombosis (DVT). PRINCIPAL DISCHARGE DIAGNOSIS  Diagnosis: Pulmonary embolism  Assessment and Plan of Treatment: Pulmonary embolism (or "PE") is a blockage in one or more of the blood vessels that supply blood to the lungs. Most often these blockages are caused by blood clots that form elsewhere and then travel to the lungs. In rare cases, blockages can also be caused by air bubbles, tiny globs of fat, or pieces of tumor that travel to the lungs. If a blood clot forms or gets stuck inside a blood vessel, it can clog the vessel and keep blood from getting where it needs to go. When that happens in the lungs, the lungs can get damaged. Having blocked arteries in the lung can also make it hard to breathe and can even lead to death. Common symptoms include panting, shortness of breath, or trouble breathing, sharp, knife-like chest pain when you breathe in or strain, coughing or coughing up blood or simply a rapid heartbeat. If you get any of these symptoms, especially if they happen over a short period of time (hours or days) or get worse quickly, call for an ambulance. At the hospital, doctors can run tests to find out if you do have a clot. Blood clots in the lungs can lead to death. That's why it's important to act fast and find out if there is a clot. Please take your eliquis as perscribed.         SECONDARY DISCHARGE DIAGNOSES  Diagnosis: DEANNE (obstructive sleep apnea)  Assessment and Plan of Treatment:     Diagnosis: Obesity  Assessment and Plan of Treatment:     Diagnosis: Gout  Assessment and Plan of Treatment:     Diagnosis: Acute deep vein thrombosis (DVT) of other specified vein of right lower extremity  Assessment and Plan of Treatment: You have a history of blood clots forming in your right leg. This is dangerous because the clot can travel from your legs into your lungs, causing a sudden blockage of an artery and making difficult for you to breathe. In order to prevent this from happening, it is important that you take your blood thinner medication everyday and avoid laying/sitting for prolonged periods of time. Please see your primary care physician in 1-2 weeks. If you happen to experience sudden shortness of breath, chest pain, or a fast beating heart, please return to the emergency department for interval evaluation of your deep vein thrombosis (DVT).

## 2019-10-09 NOTE — DIETITIAN INITIAL EVALUATION ADULT. - PROBLEM SELECTOR PLAN 2
Currently 91% on RA and SoB with exertion likely 2/2 clot burden. Wean O2 as tolerated.   -Monserrat PRN   -OOBTC   -Incentive ritesh

## 2019-10-09 NOTE — PROGRESS NOTE ADULT - PROBLEM SELECTOR PLAN 5
On losartan 50 and nifedipine 60 ER at home. Likely exac by obesity/connie. SBps 120 this am   -Restart as pressures tolerate

## 2019-10-09 NOTE — DIETITIAN INITIAL EVALUATION ADULT. - PERTINENT LABORATORY DATA
(10/9) cholesterol 225 (H), triglycerides 205 (H), HDL 35 (L),  (H), HbA1c 6.2% (H), (10/8) Glucose 167 (H), ALT/SGPT 45 (H)

## 2019-10-09 NOTE — DIETITIAN INITIAL EVALUATION ADULT. - ENERGY NEEDS
Ht: 71.5 in Wt: 300 lbs IBW: 175lbs (+/-10%), 171%IBW, BMI: 41.3 kg/m2   IBW (79.3 kg) used for calculations as pt >120% of IBW.  Nutrient needs based on [facility] standards of care for adults. Needs adjusted for morbid obesity. Stated Ht: 71.5 in Wt: 300 lbs IBW: 175lbs (+/-10%), 171%IBW, BMI: 41.3 kg/m2   IBW (79.3 kg) used for calculations as pt >120% of IBW.  Nutrient needs based on [facility] standards of care for adults. Needs adjusted for morbid obesity.

## 2019-10-09 NOTE — PROGRESS NOTE ADULT - PROBLEM SELECTOR PLAN 1
Progressive Sim, Ddimer >800 with acute lobar, segmental pulm artery emboli no PA dilation or RHS. Pt is tachy 100-110s. Has risk factors (DEANNE, obesity) without clear provoking triggers (sedentary lifestyle) no fam hx of clotting. Trops and BNP negative. EKG no ischemic changes. Arrived on heparin gtt. EKG No HD instabilty.   -hep gtt  -Echo   -vacular consulted-no acute intervention at this time

## 2019-10-09 NOTE — DISCHARGE NOTE PROVIDER - CARE PROVIDER_API CALL
Jasmina Coleman)  HematologyOncology; Internal Medicine  [address] [address]  Phone: [phone]  Fax: [phone]  Follow Up Time:     aNncy Garcia)  Critical Care Medicine; Internal Medicine  [address], [address]  [place] [address]  Phone: [phone]  Fax: [phone]  Follow Up Time:

## 2019-10-09 NOTE — DIETITIAN INITIAL EVALUATION ADULT. - ADD RECOMMEND
1) Recommend continue CSTCHO w/ no snacks, DASH/TLC diet. 2) Extensive nutrition education provided. Pt receptive. RD to f/u. 3) Offered info to outpatient [facility] nutrition services; Pt amenable 4) Monitor wt trends. 5) Monitor lytes and replete prn.

## 2019-10-09 NOTE — PROGRESS NOTE ADULT - SUBJECTIVE AND OBJECTIVE BOX
OVERNIGHT EVENTS: Requested tyelenol for headache.    SUBJECTIVE / INTERVAL HPI: Patient seem and examined at bedside this am reports improvement in headache, denies CP at rest, CP with inspiration, SOB at rest, no palpitations, denies leg cramps.     Review of systems negative except as noted above.     VITAL SIGNS:  Vital Signs Last 24 Hrs  T(C): 36.7 (09 Oct 2019 09:54), Max: 37.4 (08 Oct 2019 22:04)  T(F): 98 (09 Oct 2019 09:54), Max: 99.3 (08 Oct 2019 22:04)  HR: 84 (09 Oct 2019 08:20) (72 - 125)  BP: 123/69 (09 Oct 2019 08:20) (116/74 - 164/94)  BP(mean): 91 (09 Oct 2019 08:20) (91 - 108)  RR: 16 (09 Oct 2019 08:20) (14 - 20)  SpO2: 95% (09 Oct 2019 08:20) (91% - 98%)      10-08-19 @ 07:01  -  10-09-19 @ 07:00  --------------------------------------------------------  IN: 1237 mL / OUT: 400 mL / NET: 837 mL      PHYSICAL EXAM:    General: WDWN obese male, resting comfortably on bed at time of exam in NAD, on NC  HEENT: NC/AT; anicteric sclera  Neck: supple, thyroid non tender, no nodules appreciated, -JVD  Cardiovascular: +S1/S2; RRR  Respiratory: CTA B/L; no W/R/R  Gastrointestinal: soft, non-tender, non-distended, BS+4  Extremities: WWP; R edema + > L, non TTP , no erythema  Vascular: 2+ radial, DP pulses B/L  Neurological: AAOx3; no focal deficits    MEDICATIONS:  MEDICATIONS  (STANDING):  allopurinol 100 milliGRAM(s) Oral daily  heparin  Infusion 2100 Unit(s)/Hr (21 mL/Hr) IV Continuous <Continuous>    MEDICATIONS  (PRN):  acetaminophen   Tablet .. 650 milliGRAM(s) Oral every 6 hours PRN Moderate Pain (4 - 6)  ALBUTerol/ipratropium for Nebulization 3 milliLiter(s) Nebulizer every 6 hours PRN Wheezing      ALLERGIES:  Allergies    No Known Allergies    Intolerances        LABS:                        14.8   9.54  )-----------( 244      ( 09 Oct 2019 07:59 )             45.5     10    140  |  102  |  10  ----------------------------<  167<H>  3.9   |  27  |  1.19    Ca    9.1      08 Oct 2019 13:30  Phos  4.0     10-09  Mg     2.1     10-09    TPro  8.0  /  Alb  4.1  /  TBili  1.2  /  DBili  x   /  AST  28  /  ALT  45<H>  /  AlkPhos  112  10-08    PT/INR - ( 08 Oct 2019 20:32 )   PT: 12.4 sec;   INR: 1.09          PTT - ( 09 Oct 2019 07:59 )  PTT:68.2 sec  Urinalysis Basic - ( 09 Oct 2019 04:55 )    Color: Yellow / Appearance: Clear / S.025 / pH: x  Gluc: x / Ketone: Trace mg/dL  / Bili: Small / Urobili: 0.2 E.U./dL   Blood: x / Protein: 30 mg/dL / Nitrite: NEGATIVE   Leuk Esterase: NEGATIVE / RBC: < 5 /HPF / WBC < 5 /HPF   Sq Epi: x / Non Sq Epi: 0-5 /HPF / Bacteria: Present /HPF      CAPILLARY BLOOD GLUCOSE      CARDIAC MARKERS ( 08 Oct 2019 15:39 )  <0.017 ng/mL / x     / x     / x     / x          < from: CT Angio Chest PE Protocol w/ IV Cont (10.08.19 @ 16:09) >  IMPRESSION:    Bilateral acute appearing lobar and segmental branch pulmonary artery   emboli without evidence of a main pulmonary artery dilatation or right   heart strain.    Heterogeneous and enlarged thyroid gland with a 1.7 cm left lower pole   nodule. A nonemergent thyroid ultrasound is suggested.    The findings above were discussed with Dr. Chatman at 3:58 PM on 10/8/2019            Thank you for the opportunity to participate in the care of this patient.    < end of copied text >

## 2019-10-10 ENCOUNTER — INBOUND DOCUMENT (OUTPATIENT)
Age: 39
End: 2019-10-10

## 2019-10-13 DIAGNOSIS — I10 ESSENTIAL (PRIMARY) HYPERTENSION: ICD-10-CM

## 2019-10-13 DIAGNOSIS — G47.33 OBSTRUCTIVE SLEEP APNEA (ADULT) (PEDIATRIC): ICD-10-CM

## 2019-10-13 DIAGNOSIS — F41.9 ANXIETY DISORDER, UNSPECIFIED: ICD-10-CM

## 2019-10-13 DIAGNOSIS — M10.9 GOUT, UNSPECIFIED: ICD-10-CM

## 2019-10-13 DIAGNOSIS — I82.461 ACUTE EMBOLISM AND THROMBOSIS OF RIGHT CALF MUSCULAR VEIN: ICD-10-CM

## 2019-10-13 DIAGNOSIS — M79.604 PAIN IN RIGHT LEG: ICD-10-CM

## 2019-10-13 DIAGNOSIS — D75.1 SECONDARY POLYCYTHEMIA: ICD-10-CM

## 2019-10-13 DIAGNOSIS — I26.99 OTHER PULMONARY EMBOLISM WITHOUT ACUTE COR PULMONALE: ICD-10-CM

## 2019-10-13 DIAGNOSIS — E66.01 MORBID (SEVERE) OBESITY DUE TO EXCESS CALORIES: ICD-10-CM

## 2019-10-13 DIAGNOSIS — E78.5 HYPERLIPIDEMIA, UNSPECIFIED: ICD-10-CM

## 2019-10-13 DIAGNOSIS — I82.411 ACUTE EMBOLISM AND THROMBOSIS OF RIGHT FEMORAL VEIN: ICD-10-CM

## 2019-10-13 DIAGNOSIS — E04.1 NONTOXIC SINGLE THYROID NODULE: ICD-10-CM

## 2019-10-13 DIAGNOSIS — J96.01 ACUTE RESPIRATORY FAILURE WITH HYPOXIA: ICD-10-CM

## 2019-10-21 PROBLEM — E78.5 HYPERLIPIDEMIA, UNSPECIFIED: Chronic | Status: ACTIVE | Noted: 2019-10-08

## 2019-10-21 PROBLEM — I10 ESSENTIAL (PRIMARY) HYPERTENSION: Chronic | Status: ACTIVE | Noted: 2019-10-08

## 2019-10-21 PROBLEM — M10.9 GOUT, UNSPECIFIED: Chronic | Status: ACTIVE | Noted: 2019-10-08

## 2019-11-07 ENCOUNTER — APPOINTMENT (OUTPATIENT)
Dept: PULMONOLOGY | Facility: CLINIC | Age: 39
End: 2019-11-07
Payer: COMMERCIAL

## 2019-11-07 ENCOUNTER — OTHER (OUTPATIENT)
Age: 39
End: 2019-11-07

## 2019-11-07 VITALS
BODY MASS INDEX: 40.63 KG/M2 | DIASTOLIC BLOOD PRESSURE: 82 MMHG | OXYGEN SATURATION: 96 % | TEMPERATURE: 98.2 F | SYSTOLIC BLOOD PRESSURE: 150 MMHG | HEART RATE: 111 BPM | HEIGHT: 72 IN | WEIGHT: 300 LBS

## 2019-11-07 DIAGNOSIS — Z00.00 ENCOUNTER FOR GENERAL ADULT MEDICAL EXAMINATION W/OUT ABNORMAL FINDINGS: ICD-10-CM

## 2019-11-07 PROCEDURE — 99214 OFFICE O/P EST MOD 30 MIN: CPT | Mod: 25

## 2019-11-07 PROCEDURE — 94618 PULMONARY STRESS TESTING: CPT

## 2019-11-07 RX ORDER — USTEKINUMAB 90 MG/ML
90 INJECTION, SOLUTION SUBCUTANEOUS
Refills: 0 | Status: ACTIVE | COMMUNITY

## 2019-11-07 RX ORDER — APIXABAN 5 MG/1
5 TABLET, FILM COATED ORAL
Refills: 0 | Status: ACTIVE | COMMUNITY

## 2019-11-07 RX ORDER — LOSARTAN POTASSIUM 50 MG/1
50 TABLET, FILM COATED ORAL
Refills: 0 | Status: ACTIVE | COMMUNITY

## 2019-11-07 RX ORDER — NIFEDIPINE 60 MG/1
60 TABLET, EXTENDED RELEASE ORAL
Refills: 0 | Status: ACTIVE | COMMUNITY

## 2019-11-07 RX ORDER — ALLOPURINOL 200 MG/1
TABLET ORAL
Refills: 0 | Status: ACTIVE | COMMUNITY

## 2019-11-07 NOTE — HISTORY OF PRESENT ILLNESS
[ESS: ___] : ESS score [unfilled] [FreeTextEntry1] : 39 yr old male with PMH of PE, DVT, HLD, obesity, severe DEANNE noncompliant with CPAP and HTN.  Presents today s/p hospitalization at [facility] d/c on 10/9/2019 with dx of bilateral pulmonary embolism without evidence of right heart strain and extensive RLE clot.   Pt has high risk factors (DEANNE. obesity) without clear provoking triggers (sedentary lifestyle) non fam hx of clotting. Pt was placed on heparin gtt during admission and transitioned to oral anticoagulation.  \par \par Currently on Eliquis 5 mg BID. Recently resumed work recently and has noticed that his right calf has felt very stiff & that he is bruising easily on his calves. No dark, tarry stools. No coughing up blood. No blood in urine. No bloody gums or nose.\par \par States breathing has improved a lot since hospital discharge. Primarily short of breath with stair-climbing. \par \par Works in [employer] and sits at desk for long periods of time during the day. Trying to stand up more at least every hour and drink more water throughout the day. Trying to stretch more in the morning and at night. \par \par Seeing hematologist (Dr. Coleman) for hematologic workup for underlying clotting disorders. \par \par Currently seeing an ENT specialist regarding thyroid to examine nodule incidentally found on CT scan during hospitalization.

## 2019-11-07 NOTE — PROCEDURE
[FreeTextEntry1] : \par EXAM: CT ANGIO CHEST PE PROTOCOL IC \par \par PROCEDURE DATE: 10/08/2019 \par \par \par \par INTERPRETATION: CTA (CT angiography) of the CHEST dated 10/8/2019 4:09 PM \par \par INDICATION: SOB. Assess for pulmonary embolism. \par \par TECHNIQUE: CT angiography of the chest was performed during bolus injection \par of intravenous contrast. Post-processing including the production of axial, \par coronal and sagittal multiplanar reformatted images and axial and coronal \par maximum intensity projections (MIPs) was performed. \par \par CONTRAST: 120 cc Omnipaque 350 \par \par COMPARISON: None \par \par FINDINGS: \par \par Lower neck: Visualized thyroid unremarkable. No lower cervical \par lymphadenopathy. \par \par Vascular: There are bilateral pulmonary emboli involving the right upper, \par middle, and lower lobe segmental branches with extension into the \par subsegmental branches as well as the left upper, lingular and lower lobe \par segmental branches with extension into the subsegmental branches. Main \par pulmonary artery is normal in caliber. No evidence of right ventricular \par strain. No aortic aneurysm or dissection. \par \par Mediastinum: Mild cardiomegaly. There is straightening of the \par intraventricular septum. No pericardial effusion. Bilateral heterogeneous \par and prominent thyroid glands with a 1.7 cm dominant left lower lobe nodule. \par \par Trachea/Central airways: Patent. \par \par Lungs/Pleura: Clear lungs. No pleural effusion or pneumothorax. There a few \par scattered punctate calcified granulomas. \par \par Lymph nodes: No lymphadenopathy. \par \par Upper abdomen: Hepatic steatosis. \par \par Bones/Soft tissues: Unremarkable. \par \par \par IMPRESSION: \par \par Bilateral acute appearing lobar and segmental branch pulmonary artery emboli \par without evidence of a main pulmonary artery dilatation or right heart strain. \par \par Heterogeneous and enlarged thyroid gland with a 1.7 cm left lower pole \par nodule. A nonemergent thyroid ultrasound is suggested. \par \par The findings above were discussed with Dr. Chatman at 3:58 PM on 10/8/2019 \par \par \par EXAM: ECHOCARDIOGRAM (CARDIOL) \par \par PROCEDURE DATE: 10/09/2019 \par \par \par \par INTERPRETATION: REPORT: \par ----------------------------------- \par TRANSTHORACIC ECHOCARDIOGRAM REPORT \par \par \par ------------------------------------------------------------------------------ \par -- \par Patient Name: KERA COREY Date of Exam: 10/9/2019 \par Medical Rec #: [id number] Height: 72.0 in \par Account #: [id number] Weight: 299.8 lb \par YOB: 1980 BSA: 2.53 mï¿½ \par Patient Age: 39 years BP: 167/84 mmHg \par Patient Gender: M Sonographer: ANISHA PUGA \par  Refering Physician: FORREST ACUNA \par \par \par CPT: ECHO TTE WO CON COMP - 42587; - [id number] \par Indication(s): R94.31 - Abnormal electrocardiogram [ECG] [EKG] \par Procedure: A complete two-dimensional transthoracic echocardiogram was \par  performed: 2D, M-mode, spectral and color flow Doppler. \par \par Study Quality: Study quality was good. \par \par \par ------------------------------------------------------------------------------ \par -- \par CONCLUSIONS: \par \par  1. Normal left and right ventricular size and systolic function. \par  2. Grade II left ventricular diastolic dysfunction with elevated filling \par pressure. \par  3. No significant valvular disease. \par  4. No pericardial effusion. \par \par ------------------------------------------------------------------------------ \par -- \par 2D AND M-MODE MEASUREMENTS (normal ranges within parentheses): \par \par Left Ventricle: Normal \par IVSd (2D): 1.03 cm (0.7-1.1) \par LVPWd (2D): 0.92 cm (0.7-1.1) \par LVIDd (2D): 5.91 cm (3.4-5.7) \par LVIDs (2D): 3.23 cm \par LV FS (2D): 45.3 % (>25%) \par LV EF (MOD BP): 69 % (>55%) \par \par LV DIASTOLIC FUNCTION: \par \par MV Peak E: 85.40 cm/s MV e' lat: 7.1 cm/s MV E/e' lat ratio: 12.0 \par MV Peak A: 75.50 cm/s MV e' med: 5.9 cm/s MV E/e' med ratio: 14.4 \par E/A Ratio: 1.13 MV e' av.5 cm/s \par \par SPECTRAL DOPPLER ANALYSIS: \par \par Aortic Valve: AoV Max Bimal: 1.21 m/s AoV Peak P.8 mmHg AoV Mean PG: \par  3.5 mmHg \par LVOT Vmax: 1.23 m/s LVOT VTI: 0.222 m LVOT Diameter: 2.50 cm \par AoV Area, VMax: 5.00 cmï¿½ AoV Area, VTI: 5.46 cmï¿½ AoV Area, Vmn: 4.96 cmï¿½ \par \par Tricuspid Valve and PA/RV Systolic Pressure: TR Max Velocity: 2.50 m/s RA \par Pressure: 5 mmHg RVSP/PASP: 30.0 mmHg \par TAPSE: 24 mm RV S': \par \par \par ------------------------------------------------------------------------------ \par -- \par FINDINGS: \par \par Left Ventricle: \par The left ventricle is normal in size, wall thickness, and systolic function \par with a calculated ejection fraction of 69.0 %. Left ventricular ejection \par fraction is 69.0 %. There are no regional wall motion abnormalities seen. \par Analysis of mitral annular tissue Doppler, left atrial volume index, and \par tricuspid regurgitant velocity reveals: grade II left ventricular diastolic \par dysfunction with elevated filling pressure. \par \par Right Ventricle: \par The right ventricle is normal in size and systolic function. The tricuspid \par annular plane systolic excursion (TAPSE) is 24 mm (normal 17mm or higher). \par The right ventricle is normal in size. \par \par Left Atrium: \par The left atrium is normal in size. Left atrial volume index (CARMELLA) is 18.6 \par ml/mï¿½. \par \par Right Atrium: \par The right atrium is normal in size. \par \par Aortic Valve: \par The aortic valve is tricuspid with normal structure and function without \par stenosis. The peak transvalvular velovity is 1.21 m/s, the mean \par transvalvular gradient is 3.5 mmHg, and the LVOT/AV velocity ratio is 1.02 \par m/s. The aortic valve area (estimated via the continuity method) is 5.46 \par cmï¿½. There is no aortic regurgitation. \par \par Pulmonic Valve: \par Structurally normal pulmonic valve with normal leaflet excursion. There is \par trace pulmonic regurgitation. \par \par Mitral Valve: \par Structurally normal mitral valve with normal leaflet excursion. There is \par trace mitral regurgitation. \par \par Tricuspid Valve: \par Structurally normal tricuspid valve with normal leaflet excursion. There is \par trace tricuspid regurgitation. Pulmonary artery systolic pressure (estimated \par using the tricuspid regurgitant gradient and an estimate of right atrial \par pressure) is 30.0 mmHg. \par \par Aorta: \par Aortic root 3.6cm \par Ascending aorta 3.2cm \par There is no doppler evidence of aortic coarctation. \par \par Venous: \par The inferior vena cava is normal in size (<2.1cm) with normal inspiratory \par collapse (>50%) consistent with normal right atrial pressure ( \par 3, range 0-5mmHg). \par \par Pericardium: \par No pericardial effusion is seen. \par \par Dr. Asha Katz MD. \par \par Electronically signed by Dr. Asha Katz MD. \par Signature Date/Time: 10/9/2019/4:32:34 PM \par \par \par \par *** Final *** \par \par \par \par \par \par \par \par \par "Thank you for the opportunity to participate in the care of this patient." \par \par \par \par ANU KATZ \par This document has been electronically signed. Oct 9 2019 3:44PM \par \par \par \par

## 2019-11-07 NOTE — ASSESSMENT
[FreeTextEntry1] : PE/DVT\par - Continue Eliquis 5 mg BID x 6 months minimum to a yr. DIscussed importance of staying hydrating throughout the day, staying active throughout the day and walking around every 2 hours while at work, as well as doing calf/ankle exercises during the day while sitting. Also discussed benefit of elevating legs. Advised patient to seek immediate medical attention should he develop any new or worsening shortness of breath, trouble breathing, sharp/acute chest pain, coughing up blood and/or rapid heartbeat. In addition, patient advised to notify us should he start to develop any unusual bleeding; e.g. cough up blood, blood in his urine/stool. \par - Repeat stress echo in 3 months. In addition, will repeat VQ scan (or possibly CTA chest) in 6 months. \par - Will obtain results from Dr. Coleman workup to determine if there is an underlying chronic thrombolic disorder \par - Recommends walking to stay active (as tolerated) but to avoid any more high-intensity/high-impact exercises. \par \par DEANNE\par - Not compliant with sleep apnea mask. Sleep study last done over 2 yrs ago- Dr. Monteiro. Will obtain those results. Would recommend repeat sleep study (will do split diagnostic poly with CPAP titration) to determine current status of the sleep apnea, in addition to the  most appropriate CPAP device settings as well fit him properly for a mask.  Pt was given a mask to start using his current machine. \par \par RTC in 3 month to see Dr. Berman\par \par 6MWT results\par Pre:\par Oxygen- 97%, \par Post:\par Oxygen-97%, \par \par Pre VERA score (SOB-3, fatigue-0)\par Post VERA score (SOB-3, fatigue-0)\par \par No significant drop in oxygen saturation. Normal walking dance of 511 meters \par \par Pt to follow up with cardiology due to tachycardia.

## 2019-11-26 ENCOUNTER — APPOINTMENT (OUTPATIENT)
Dept: INTERNAL MEDICINE | Facility: CLINIC | Age: 39
End: 2019-11-26

## 2020-01-03 ENCOUNTER — OTHER (OUTPATIENT)
Age: 40
End: 2020-01-03

## 2020-02-20 ENCOUNTER — APPOINTMENT (OUTPATIENT)
Dept: SLEEP CENTER | Facility: HOSPITAL | Age: 40
End: 2020-02-20

## 2020-02-25 ENCOUNTER — APPOINTMENT (OUTPATIENT)
Dept: PULMONOLOGY | Facility: CLINIC | Age: 40
End: 2020-02-25
Payer: COMMERCIAL

## 2020-02-25 VITALS
BODY MASS INDEX: 42.26 KG/M2 | SYSTOLIC BLOOD PRESSURE: 110 MMHG | TEMPERATURE: 98.8 F | HEART RATE: 110 BPM | RESPIRATION RATE: 12 BRPM | WEIGHT: 312 LBS | DIASTOLIC BLOOD PRESSURE: 84 MMHG | HEIGHT: 72 IN | OXYGEN SATURATION: 96 %

## 2020-02-25 DIAGNOSIS — I82.409 ACUTE EMBOLISM AND THROMBOSIS OF UNSPECIFIED DEEP VEINS OF UNSPECIFIED LOWER EXTREMITY: ICD-10-CM

## 2020-02-25 DIAGNOSIS — I26.99 OTHER PULMONARY EMBOLISM W/OUT ACUTE COR PULMONALE: ICD-10-CM

## 2020-02-25 DIAGNOSIS — G47.33 OBSTRUCTIVE SLEEP APNEA (ADULT) (PEDIATRIC): ICD-10-CM

## 2020-02-25 PROCEDURE — 99214 OFFICE O/P EST MOD 30 MIN: CPT

## 2020-02-26 PROBLEM — I82.409 DVT, LOWER EXTREMITY: Status: ACTIVE | Noted: 2019-11-07

## 2020-02-26 PROBLEM — I26.99 PULMONARY EMBOLI: Status: ACTIVE | Noted: 2019-11-07

## 2020-02-26 PROBLEM — G47.33 OBSTRUCTIVE SLEEP APNEA: Status: ACTIVE | Noted: 2019-11-07

## 2020-02-26 NOTE — HISTORY OF PRESENT ILLNESS
[TextBox_4] : The patient is doing well. He is walking with less shortness of breath. There is no bleeding. No chest pain. No leg swelling. He is trying to be compliant with the CPAP mask. But the mass was not comfortable that he has

## 2020-02-26 NOTE — PHYSICAL EXAM
[No Acute Distress] : no acute distress [Normal Oropharynx] : normal oropharynx [No Neck Mass] : no neck mass [Normal Appearance] : normal appearance [Normal Rate/Rhythm] : normal rate/rhythm [No Murmurs] : no murmurs [Normal S1, S2] : normal s1, s2 [Clear to Auscultation Bilaterally] : clear to auscultation bilaterally [No Abnormalities] : no abnormalities [No Resp Distress] : no resp distress [Benign] : benign [No Clubbing] : no clubbing [Normal Gait] : normal gait [FROM] : FROM [No Cyanosis] : no cyanosis [No Edema] : no edema [Normal Color/ Pigmentation] : normal color/ pigmentation [Oriented x3] : oriented x3 [No Focal Deficits] : no focal deficits [Normal Affect] : normal affect

## 2020-02-26 NOTE — ASSESSMENT
[FreeTextEntry1] : PE/DVT\par - Continue Eliquis 5 mg BID x 12 months minimum. DIscussed importance of staying hydrating throughout the day, staying active throughout the day and walking around every 2 hours while at work, as well as doing calf/ankle exercises during the day while sitting. Also discussed benefit of elevating legs. Advised patient to seek immediate medical attention should he develop any new or worsening shortness of breath, trouble breathing, sharp/acute chest pain, coughing up blood and/or rapid heartbeat. In addition, patient advised to notify us should he start to develop any unusual bleeding; e.g. cough up blood, blood in his urine/stool. \par - Repeat stress echo ordered. In addition, will repeat VQ scan (or possibly CTA chest) in 3 months. \par - Will obtain results from Dr. Coleman workup to determine if there is an underlying chronic thrombolic disorder \par - Recommends walking to stay active (as tolerated) but to avoid any more high-intensity/high-impact exercises. \par - i discussed the case in details with the patient and explained the need for prophylaxis after stopping the anticoagulation.\par DEANNE\par - The patient is trying to be compliant with the CPAP mask. He is using a nasal pillow. Also on able to change to a full face mask. I informed the patient the benefit of being compliant with obstructive sleep apnea treatment on the long-term to avoid palpitations such as but not limited to pulmonary hypertension and cardiac arrest. Patient will do her best to be compliant with the CPAP mask\par \par RTC in 3 month\par \par 6MWT results\par Pre:\par Oxygen- 97%, \par Post:\par Oxygen-97%, \par \par Pre VERA score (SOB-3, fatigue-0)\par Post VERA score (SOB-3, fatigue-0)\par \par No significant drop in oxygen saturation. Normal walking dance of 511 meters \par \par

## 2020-02-26 NOTE — REASON FOR VISIT
[Follow-Up] : a follow-up visit [Sleep Apnea] : sleep apnea [Pulmonary Embolism] : pulmonary embolism

## 2021-01-04 ENCOUNTER — EMERGENCY (EMERGENCY)
Facility: HOSPITAL | Age: 41
LOS: 1 days | Discharge: HOME | End: 2021-01-04
Attending: EMERGENCY MEDICINE | Admitting: EMERGENCY MEDICINE
Payer: COMMERCIAL

## 2021-01-04 VITALS
RESPIRATION RATE: 20 BRPM | SYSTOLIC BLOOD PRESSURE: 142 MMHG | WEIGHT: 304.9 LBS | HEART RATE: 131 BPM | OXYGEN SATURATION: 94 % | TEMPERATURE: 99 F | HEIGHT: 71.5 IN | DIASTOLIC BLOOD PRESSURE: 90 MMHG

## 2021-01-04 VITALS
DIASTOLIC BLOOD PRESSURE: 85 MMHG | OXYGEN SATURATION: 96 % | HEART RATE: 98 BPM | SYSTOLIC BLOOD PRESSURE: 129 MMHG | RESPIRATION RATE: 18 BRPM

## 2021-01-04 DIAGNOSIS — Z79.899 OTHER LONG TERM (CURRENT) DRUG THERAPY: ICD-10-CM

## 2021-01-04 DIAGNOSIS — R53.83 OTHER FATIGUE: ICD-10-CM

## 2021-01-04 DIAGNOSIS — Z91.013 ALLERGY TO SEAFOOD: ICD-10-CM

## 2021-01-04 DIAGNOSIS — R00.0 TACHYCARDIA, UNSPECIFIED: ICD-10-CM

## 2021-01-04 DIAGNOSIS — Z20.822 CONTACT WITH AND (SUSPECTED) EXPOSURE TO COVID-19: ICD-10-CM

## 2021-01-04 DIAGNOSIS — I10 ESSENTIAL (PRIMARY) HYPERTENSION: ICD-10-CM

## 2021-01-04 LAB
ALBUMIN SERPL ELPH-MCNC: 4.4 G/DL — SIGNIFICANT CHANGE UP (ref 3.4–5)
ALP SERPL-CCNC: 91 U/L — SIGNIFICANT CHANGE UP (ref 40–120)
ALT FLD-CCNC: 65 U/L — HIGH (ref 12–42)
ANION GAP SERPL CALC-SCNC: 14 MMOL/L — SIGNIFICANT CHANGE UP (ref 9–16)
APTT BLD: 39.6 SEC — HIGH (ref 27.5–35.5)
AST SERPL-CCNC: 36 U/L — SIGNIFICANT CHANGE UP (ref 15–37)
BASOPHILS # BLD AUTO: 0.04 K/UL — SIGNIFICANT CHANGE UP (ref 0–0.2)
BASOPHILS NFR BLD AUTO: 0.4 % — SIGNIFICANT CHANGE UP (ref 0–2)
BILIRUB SERPL-MCNC: 0.5 MG/DL — SIGNIFICANT CHANGE UP (ref 0.2–1.2)
BUN SERPL-MCNC: 11 MG/DL — SIGNIFICANT CHANGE UP (ref 7–23)
CALCIUM SERPL-MCNC: 9.4 MG/DL — SIGNIFICANT CHANGE UP (ref 8.5–10.5)
CHLORIDE SERPL-SCNC: 103 MMOL/L — SIGNIFICANT CHANGE UP (ref 96–108)
CO2 SERPL-SCNC: 25 MMOL/L — SIGNIFICANT CHANGE UP (ref 22–31)
CREAT SERPL-MCNC: 1.03 MG/DL — SIGNIFICANT CHANGE UP (ref 0.5–1.3)
D DIMER BLD IA.RAPID-MCNC: <187 NG/ML DDU — SIGNIFICANT CHANGE UP
EOSINOPHIL # BLD AUTO: 0.11 K/UL — SIGNIFICANT CHANGE UP (ref 0–0.5)
EOSINOPHIL NFR BLD AUTO: 1.1 % — SIGNIFICANT CHANGE UP (ref 0–6)
ETHANOL SERPL-MCNC: <3 MG/DL — SIGNIFICANT CHANGE UP
GLUCOSE SERPL-MCNC: 139 MG/DL — HIGH (ref 70–99)
HCT VFR BLD CALC: 49.3 % — SIGNIFICANT CHANGE UP (ref 39–50)
HGB BLD-MCNC: 16.5 G/DL — SIGNIFICANT CHANGE UP (ref 13–17)
IMM GRANULOCYTES NFR BLD AUTO: 0.4 % — SIGNIFICANT CHANGE UP (ref 0–1.5)
INR BLD: 1.35 — HIGH (ref 0.88–1.16)
LACTATE SERPL-SCNC: 1.8 MMOL/L — SIGNIFICANT CHANGE UP (ref 0.4–2)
LIDOCAIN IGE QN: 112 U/L — SIGNIFICANT CHANGE UP (ref 73–393)
LYMPHOCYTES # BLD AUTO: 3.65 K/UL — HIGH (ref 1–3.3)
LYMPHOCYTES # BLD AUTO: 36.4 % — SIGNIFICANT CHANGE UP (ref 13–44)
MAGNESIUM SERPL-MCNC: 1.9 MG/DL — SIGNIFICANT CHANGE UP (ref 1.6–2.6)
MCHC RBC-ENTMCNC: 29.7 PG — SIGNIFICANT CHANGE UP (ref 27–34)
MCHC RBC-ENTMCNC: 33.5 GM/DL — SIGNIFICANT CHANGE UP (ref 32–36)
MCV RBC AUTO: 88.8 FL — SIGNIFICANT CHANGE UP (ref 80–100)
MONOCYTES # BLD AUTO: 0.79 K/UL — SIGNIFICANT CHANGE UP (ref 0–0.9)
MONOCYTES NFR BLD AUTO: 7.9 % — SIGNIFICANT CHANGE UP (ref 2–14)
NEUTROPHILS # BLD AUTO: 5.39 K/UL — SIGNIFICANT CHANGE UP (ref 1.8–7.4)
NEUTROPHILS NFR BLD AUTO: 53.8 % — SIGNIFICANT CHANGE UP (ref 43–77)
NRBC # BLD: 0 /100 WBCS — SIGNIFICANT CHANGE UP (ref 0–0)
NT-PROBNP SERPL-SCNC: 24 PG/ML — SIGNIFICANT CHANGE UP
PLATELET # BLD AUTO: 348 K/UL — SIGNIFICANT CHANGE UP (ref 150–400)
POTASSIUM SERPL-MCNC: 4.1 MMOL/L — SIGNIFICANT CHANGE UP (ref 3.5–5.3)
POTASSIUM SERPL-SCNC: 4.1 MMOL/L — SIGNIFICANT CHANGE UP (ref 3.5–5.3)
PROT SERPL-MCNC: 7.8 G/DL — SIGNIFICANT CHANGE UP (ref 6.4–8.2)
PROTHROM AB SERPL-ACNC: 16 SEC — HIGH (ref 10.6–13.6)
RBC # BLD: 5.55 M/UL — SIGNIFICANT CHANGE UP (ref 4.2–5.8)
RBC # FLD: 12.1 % — SIGNIFICANT CHANGE UP (ref 10.3–14.5)
SARS-COV-2 RNA SPEC QL NAA+PROBE: SIGNIFICANT CHANGE UP
SODIUM SERPL-SCNC: 142 MMOL/L — SIGNIFICANT CHANGE UP (ref 132–145)
TROPONIN I SERPL-MCNC: <0.017 NG/ML — LOW (ref 0.02–0.06)
WBC # BLD: 10.02 K/UL — SIGNIFICANT CHANGE UP (ref 3.8–10.5)
WBC # FLD AUTO: 10.02 K/UL — SIGNIFICANT CHANGE UP (ref 3.8–10.5)

## 2021-01-04 PROCEDURE — 71045 X-RAY EXAM CHEST 1 VIEW: CPT | Mod: 26

## 2021-01-04 PROCEDURE — 93010 ELECTROCARDIOGRAM REPORT: CPT

## 2021-01-04 PROCEDURE — 99285 EMERGENCY DEPT VISIT HI MDM: CPT

## 2021-01-04 RX ORDER — DIAZEPAM 5 MG
5 TABLET ORAL ONCE
Refills: 0 | Status: COMPLETED | OUTPATIENT
Start: 2021-01-04 | End: 2021-01-04

## 2021-01-04 NOTE — ED ADULT NURSE NOTE - CHIEF COMPLAINT QUOTE
sent in from televisit MD for further evaluation of high BP and tachycardia at home since Sunday. also c/o fatigue and lightheadedness. h/o PE, HTN, pre-diabetes. reports nicardipine has increased from 60mg to 90mg a few weeks ago. also states having nodules in his thyroid.

## 2021-01-04 NOTE — ED PROVIDER NOTE - NSFOLLOWUPINSTRUCTIONS_ED_ALL_ED_FT
Follow up with your primary care doctor or clinics listed below if you do not have a doctor  Return immediately for any new or worsening symptoms or any new concerns  Viral Respiratory Infection    A viral respiratory infection is an illness that affects parts of the body used for breathing, like the lungs, nose, and throat. It is caused by a germ called a virus. Symptoms can include runny nose, coughing, sneezing, fatigue, body aches, sore throat, fever, or headache. Over the counter medicine can be used to manage the symptoms but the infection typically goes away on its own in 5 to 10 days.     SEEK IMMEDIATE MEDICAL CARE IF YOU HAVE ANY OF THE FOLLOWING SYMPTOMS: shortness of breath, chest pain, fever over 10 days, or lightheadedness/dizziness.

## 2021-01-04 NOTE — ED PROVIDER NOTE - CHPI ED SYMPTOMS NEG
no dizziness/no numbness/no pain/no tingling/no vomiting/no weakness/no chills/no decreased eating/drinking

## 2021-01-04 NOTE — ED ADULT NURSE NOTE - OBJECTIVE STATEMENT
s/p telemed appt instructed to come to er for eval of dizziness, fatigue, elevated hr and mp since the weekend, no s/s of distress, awaiting provider eval

## 2021-01-04 NOTE — ED PROVIDER NOTE - PATIENT PORTAL LINK FT
You can access the FollowMyHealth Patient Portal offered by [facility] by registering at the following website: [email or web address]. By joining [employer]’s FollowMyHealth portal, you will also be able to view your health information using other applications (apps) compatible with our system.

## 2021-01-04 NOTE — ED PROVIDER NOTE - CLINICAL SUMMARY MEDICAL DECISION MAKING FREE TEXT BOX
Patient hc of PE/DVT on AC compliant here for eval of fatigue, tachycardia and subjective fevers  Patient states he is very strict about his AC meds.   Plan: labs,dimer,cxr,ekg, COVID r/o   Patient tachycardia resolved with IVF and patient states he feels much more calm than when on arrival   Patient discharged with return instructions.     Normal vitals at d/c  appears well ambulating without assistance

## 2021-01-04 NOTE — ED PROVIDER NOTE - OBJECTIVE STATEMENT
39 y/o male hx of PE/DVt on AC compliant now here stating he has felt fatigue for the past several days. Patient states he was sent in by his PMD office for eval of tachycardia and elevated BP. Patient states he has been very stressed out at work and noticed his BP has been elevated since he has felt this way. States he has baseline anxiety and has felt this way in the past. Denies chest pain,nausea,vomiting,diarrhea,trauma,loc. Patient states he has also had subjective fevers the past several days associated with his fatigue. Appears anxious NAD

## 2021-01-09 LAB
CULTURE RESULTS: SIGNIFICANT CHANGE UP
CULTURE RESULTS: SIGNIFICANT CHANGE UP
SPECIMEN SOURCE: SIGNIFICANT CHANGE UP
SPECIMEN SOURCE: SIGNIFICANT CHANGE UP

## 2021-02-09 NOTE — ED PROVIDER NOTE - SKIN NEGATIVE STATEMENT, MLM
Returned pt call and lvm with requested fax# to [employer]. Also left her their phone# if she has any questions.  Work phone  [phone]  ?    Business fax  [phone]   no abrasions and no rashes.

## 2022-06-10 NOTE — PHYSICAL EXAM
[General Appearance - Well Developed] : well developed [Normal Appearance] : normal appearance [Well Groomed] : well groomed [General Appearance - Well Nourished] : well nourished [No Deformities] : no deformities [General Appearance - In No Acute Distress] : no acute distress [Normal Conjunctiva] : the conjunctiva exhibited no abnormalities [Eyelids - No Xanthelasma] : the eyelids demonstrated no xanthelasmas [Normal Oropharynx] : normal oropharynx [Neck Appearance] : the appearance of the neck was normal [Neck Cervical Mass (___cm)] : no neck mass was observed [Jugular Venous Distention Increased] : there was no jugular-venous distention [Thyroid Diffuse Enlargement] : the thyroid was not enlarged [Thyroid Nodule] : there were no palpable thyroid nodules [Heart Rate And Rhythm] : heart rate and rhythm were normal [Heart Sounds] : normal S1 and S2 [Murmurs] : no murmurs present [Respiration, Rhythm And Depth] : normal respiratory rhythm and effort [Exaggerated Use Of Accessory Muscles For Inspiration] : no accessory muscle use [Auscultation Breath Sounds / Voice Sounds] : lungs were clear to auscultation bilaterally [Abdomen Soft] : soft [Abdomen Tenderness] : non-tender [Abdomen Mass (___ Cm)] : no abdominal mass palpated [Skin Color & Pigmentation] : normal skin color and pigmentation [Skin Turgor] : normal skin turgor [] : no rash [Deep Tendon Reflexes (DTR)] : deep tendon reflexes were 2+ and symmetric [Sensation] : the sensory exam was normal to light touch and pinprick [No Focal Deficits] : no focal deficits [Oriented To Time, Place, And Person] : oriented to person, place, and time [Impaired Insight] : insight and judgment were intact [Affect] : the affect was normal [Abnormal Walk] : normal gait [Nail Clubbing] : no clubbing of the fingernails [Cyanosis, Localized] : no localized cyanosis 10-Jey-2022 12:04

## 2022-07-07 NOTE — ED PROVIDER NOTE - MUSCULOSKELETAL [-], MLM
Addended by: GUICHO NICHOLAS on: 7/7/2022 05:26 PM     Modules accepted: Orders    
no back pain/no limited range of motion
